# Patient Record
Sex: MALE | Race: OTHER | HISPANIC OR LATINO | Employment: FULL TIME | URBAN - METROPOLITAN AREA
[De-identification: names, ages, dates, MRNs, and addresses within clinical notes are randomized per-mention and may not be internally consistent; named-entity substitution may affect disease eponyms.]

---

## 2019-06-27 ENCOUNTER — OFFICE VISIT (OUTPATIENT)
Dept: URGENT CARE | Facility: PHYSICIAN GROUP | Age: 34
End: 2019-06-27

## 2019-06-27 VITALS
BODY MASS INDEX: 22.81 KG/M2 | OXYGEN SATURATION: 96 % | SYSTOLIC BLOOD PRESSURE: 162 MMHG | TEMPERATURE: 99.2 F | HEIGHT: 69 IN | DIASTOLIC BLOOD PRESSURE: 100 MMHG | HEART RATE: 98 BPM | WEIGHT: 154 LBS

## 2019-06-27 DIAGNOSIS — L03.115 CELLULITIS OF RIGHT LOWER EXTREMITY: ICD-10-CM

## 2019-06-27 PROCEDURE — 99203 OFFICE O/P NEW LOW 30 MIN: CPT | Performed by: FAMILY MEDICINE

## 2019-06-27 RX ORDER — SULFAMETHOXAZOLE AND TRIMETHOPRIM 800; 160 MG/1; MG/1
1 TABLET ORAL 2 TIMES DAILY
Qty: 20 TAB | Refills: 0 | Status: SHIPPED | OUTPATIENT
Start: 2019-06-27 | End: 2019-07-07

## 2019-06-27 ASSESSMENT — ENCOUNTER SYMPTOMS
NUMBNESS: 0
LEG PAIN: 1
FEVER: 0
VOMITING: 0
WEAKNESS: 0
CHILLS: 0
NAUSEA: 0

## 2019-06-27 NOTE — PROGRESS NOTES
"Subjective:     Ilir Garcia is a 34 y.o. male who presents for Leg Pain (right leg shin injury on sat, now swelling and redness)       Leg Pain   This is a new problem. The current episode started in the past 7 days. The problem occurs constantly. The problem has been gradually worsening. Pertinent negatives include no chills, fever, nausea, numbness, vomiting or weakness.     Review of Systems   Constitutional: Negative for chills and fever.   Gastrointestinal: Negative for nausea and vomiting.   Neurological: Negative for weakness and numbness.     No Known Allergies   Objective:   BP (!) 162/100   Pulse 98   Temp 37.3 °C (99.2 °F)   Ht 1.753 m (5' 9\")   Wt 69.9 kg (154 lb)   SpO2 96%   BMI 22.74 kg/m²   Physical Exam   Constitutional: He is oriented to person, place, and time. He appears well-developed and well-nourished. No distress.   HENT:   Head: Normocephalic and atraumatic.   Eyes: Pupils are equal, round, and reactive to light. Conjunctivae and EOM are normal.   Cardiovascular: Normal rate and regular rhythm.    No murmur heard.  Pulmonary/Chest: Effort normal and breath sounds normal. No respiratory distress.   Abdominal: Soft. He exhibits no distension. There is no tenderness.   Neurological: He is alert and oriented to person, place, and time. He has normal reflexes. No sensory deficit.   Skin: Skin is warm and dry.        Psychiatric: He has a normal mood and affect.        Assessment/Plan:   1. Cellulitis of right lower extremity  - sulfamethoxazole-trimethoprim (BACTRIM DS) 800-160 MG tablet; Take 1 Tab by mouth 2 times a day for 10 days.  Dispense: 20 Tab; Refill: 0    Differential diagnosis, natural history, supportive care, and indications for immediate follow-up discussed.      "